# Patient Record
Sex: FEMALE | Race: WHITE | NOT HISPANIC OR LATINO | Employment: FULL TIME | URBAN - METROPOLITAN AREA
[De-identification: names, ages, dates, MRNs, and addresses within clinical notes are randomized per-mention and may not be internally consistent; named-entity substitution may affect disease eponyms.]

---

## 2024-09-22 ENCOUNTER — APPOINTMENT (OUTPATIENT)
Dept: MRI IMAGING | Facility: CLINIC | Age: 54
End: 2024-09-22
Attending: EMERGENCY MEDICINE

## 2024-09-22 ENCOUNTER — HOSPITAL ENCOUNTER (EMERGENCY)
Facility: CLINIC | Age: 54
Discharge: HOME | End: 2024-09-22
Attending: EMERGENCY MEDICINE | Admitting: EMERGENCY MEDICINE

## 2024-09-22 VITALS
HEART RATE: 64 BPM | TEMPERATURE: 97.9 F | HEIGHT: 63 IN | OXYGEN SATURATION: 98 % | SYSTOLIC BLOOD PRESSURE: 134 MMHG | RESPIRATION RATE: 11 BRPM | BODY MASS INDEX: 23.05 KG/M2 | WEIGHT: 130.07 LBS | DIASTOLIC BLOOD PRESSURE: 101 MMHG

## 2024-09-22 DIAGNOSIS — R42 DIZZINESS: ICD-10-CM

## 2024-09-22 DIAGNOSIS — R55 SYNCOPE AND COLLAPSE: ICD-10-CM

## 2024-09-22 DIAGNOSIS — I67.1 ANEURYSM OF CIRCLE OF WILLIS: ICD-10-CM

## 2024-09-22 LAB
ALBUMIN SERPL BCG-MCNC: 3.9 G/DL (ref 3.5–5.2)
ALBUMIN UR-MCNC: NEGATIVE MG/DL
ALP SERPL-CCNC: 74 U/L (ref 40–150)
ALT SERPL W P-5'-P-CCNC: 15 U/L (ref 0–50)
ANION GAP SERPL CALCULATED.3IONS-SCNC: 11 MMOL/L (ref 7–15)
APPEARANCE UR: CLEAR
AST SERPL W P-5'-P-CCNC: 13 U/L (ref 0–45)
ATRIAL RATE - MUSE: 60 BPM
BASOPHILS # BLD AUTO: 0 10E3/UL (ref 0–0.2)
BASOPHILS NFR BLD AUTO: 0 %
BILIRUB SERPL-MCNC: <0.2 MG/DL
BILIRUB UR QL STRIP: NEGATIVE
BUN SERPL-MCNC: 16.8 MG/DL (ref 6–20)
CALCIUM SERPL-MCNC: 9.1 MG/DL (ref 8.8–10.4)
CHLORIDE SERPL-SCNC: 106 MMOL/L (ref 98–107)
COLOR UR AUTO: ABNORMAL
CREAT SERPL-MCNC: 0.8 MG/DL (ref 0.51–0.95)
DIASTOLIC BLOOD PRESSURE - MUSE: NORMAL MMHG
EGFRCR SERPLBLD CKD-EPI 2021: 87 ML/MIN/1.73M2
EOSINOPHIL # BLD AUTO: 0.2 10E3/UL (ref 0–0.7)
EOSINOPHIL NFR BLD AUTO: 2 %
ERYTHROCYTE [DISTWIDTH] IN BLOOD BY AUTOMATED COUNT: 12.6 % (ref 10–15)
FLUAV RNA SPEC QL NAA+PROBE: NEGATIVE
FLUBV RNA RESP QL NAA+PROBE: NEGATIVE
GLUCOSE SERPL-MCNC: 126 MG/DL (ref 70–99)
GLUCOSE UR STRIP-MCNC: NEGATIVE MG/DL
HCO3 SERPL-SCNC: 25 MMOL/L (ref 22–29)
HCT VFR BLD AUTO: 37.2 % (ref 35–47)
HGB BLD-MCNC: 12.6 G/DL (ref 11.7–15.7)
HGB UR QL STRIP: NEGATIVE
HOLD SPECIMEN: NORMAL
HOLD SPECIMEN: NORMAL
IMM GRANULOCYTES # BLD: 0 10E3/UL
IMM GRANULOCYTES NFR BLD: 0 %
INTERPRETATION ECG - MUSE: NORMAL
KETONES UR STRIP-MCNC: NEGATIVE MG/DL
LEUKOCYTE ESTERASE UR QL STRIP: ABNORMAL
LYMPHOCYTES # BLD AUTO: 3.4 10E3/UL (ref 0.8–5.3)
LYMPHOCYTES NFR BLD AUTO: 48 %
MAGNESIUM SERPL-MCNC: 2.2 MG/DL (ref 1.7–2.3)
MCH RBC QN AUTO: 32.4 PG (ref 26.5–33)
MCHC RBC AUTO-ENTMCNC: 33.9 G/DL (ref 31.5–36.5)
MCV RBC AUTO: 96 FL (ref 78–100)
MONOCYTES # BLD AUTO: 0.6 10E3/UL (ref 0–1.3)
MONOCYTES NFR BLD AUTO: 8 %
MUCOUS THREADS #/AREA URNS LPF: PRESENT /LPF
NEUTROPHILS # BLD AUTO: 2.9 10E3/UL (ref 1.6–8.3)
NEUTROPHILS NFR BLD AUTO: 41 %
NITRATE UR QL: NEGATIVE
NRBC # BLD AUTO: 0 10E3/UL
NRBC BLD AUTO-RTO: 0 /100
P AXIS - MUSE: 63 DEGREES
PH UR STRIP: 6 [PH] (ref 5–7)
PLATELET # BLD AUTO: 259 10E3/UL (ref 150–450)
POTASSIUM SERPL-SCNC: 3.7 MMOL/L (ref 3.4–5.3)
PR INTERVAL - MUSE: 170 MS
PROT SERPL-MCNC: 5.6 G/DL (ref 6.4–8.3)
QRS DURATION - MUSE: 96 MS
QT - MUSE: 420 MS
QTC - MUSE: 420 MS
R AXIS - MUSE: 59 DEGREES
RBC # BLD AUTO: 3.89 10E6/UL (ref 3.8–5.2)
RBC URINE: 4 /HPF
RSV RNA SPEC NAA+PROBE: NEGATIVE
SARS-COV-2 RNA RESP QL NAA+PROBE: NEGATIVE
SODIUM SERPL-SCNC: 142 MMOL/L (ref 135–145)
SP GR UR STRIP: 1.01 (ref 1–1.03)
SQUAMOUS EPITHELIAL: 4 /HPF
SYSTOLIC BLOOD PRESSURE - MUSE: NORMAL MMHG
T AXIS - MUSE: 62 DEGREES
TROPONIN T SERPL HS-MCNC: <6 NG/L
TROPONIN T SERPL HS-MCNC: <6 NG/L
TSH SERPL DL<=0.005 MIU/L-ACNC: 2.14 UIU/ML (ref 0.3–4.2)
UROBILINOGEN UR STRIP-MCNC: NORMAL MG/DL
VENTRICULAR RATE- MUSE: 60 BPM
WBC # BLD AUTO: 7 10E3/UL (ref 4–11)
WBC URINE: 8 /HPF

## 2024-09-22 PROCEDURE — 85041 AUTOMATED RBC COUNT: CPT | Performed by: EMERGENCY MEDICINE

## 2024-09-22 PROCEDURE — 83735 ASSAY OF MAGNESIUM: CPT | Performed by: EMERGENCY MEDICINE

## 2024-09-22 PROCEDURE — 99285 EMERGENCY DEPT VISIT HI MDM: CPT | Mod: 25

## 2024-09-22 PROCEDURE — 93005 ELECTROCARDIOGRAM TRACING: CPT

## 2024-09-22 PROCEDURE — 84443 ASSAY THYROID STIM HORMONE: CPT | Performed by: EMERGENCY MEDICINE

## 2024-09-22 PROCEDURE — 81001 URINALYSIS AUTO W/SCOPE: CPT | Performed by: EMERGENCY MEDICINE

## 2024-09-22 PROCEDURE — 96361 HYDRATE IV INFUSION ADD-ON: CPT

## 2024-09-22 PROCEDURE — 80053 COMPREHEN METABOLIC PANEL: CPT | Performed by: EMERGENCY MEDICINE

## 2024-09-22 PROCEDURE — 70544 MR ANGIOGRAPHY HEAD W/O DYE: CPT

## 2024-09-22 PROCEDURE — 84484 ASSAY OF TROPONIN QUANT: CPT | Performed by: EMERGENCY MEDICINE

## 2024-09-22 PROCEDURE — 87637 SARSCOV2&INF A&B&RSV AMP PRB: CPT | Performed by: EMERGENCY MEDICINE

## 2024-09-22 PROCEDURE — A9585 GADOBUTROL INJECTION: HCPCS | Performed by: EMERGENCY MEDICINE

## 2024-09-22 PROCEDURE — 255N000002 HC RX 255 OP 636: Performed by: EMERGENCY MEDICINE

## 2024-09-22 PROCEDURE — 250N000013 HC RX MED GY IP 250 OP 250 PS 637: Performed by: EMERGENCY MEDICINE

## 2024-09-22 PROCEDURE — 36415 COLL VENOUS BLD VENIPUNCTURE: CPT | Performed by: EMERGENCY MEDICINE

## 2024-09-22 PROCEDURE — 70553 MRI BRAIN STEM W/O & W/DYE: CPT

## 2024-09-22 PROCEDURE — 258N000003 HC RX IP 258 OP 636: Performed by: EMERGENCY MEDICINE

## 2024-09-22 PROCEDURE — 96360 HYDRATION IV INFUSION INIT: CPT | Mod: 59

## 2024-09-22 PROCEDURE — 70549 MR ANGIOGRAPH NECK W/O&W/DYE: CPT

## 2024-09-22 RX ORDER — PROPRANOLOL HYDROCHLORIDE 10 MG/1
10 TABLET ORAL ONCE
COMMUNITY

## 2024-09-22 RX ORDER — GADOBUTROL 604.72 MG/ML
10 INJECTION INTRAVENOUS ONCE
Status: COMPLETED | OUTPATIENT
Start: 2024-09-22 | End: 2024-09-22

## 2024-09-22 RX ORDER — PROGESTERONE 200 MG/1
200 CAPSULE ORAL DAILY
COMMUNITY

## 2024-09-22 RX ORDER — MECLIZINE HYDROCHLORIDE 25 MG/1
25 TABLET ORAL ONCE
Status: COMPLETED | OUTPATIENT
Start: 2024-09-22 | End: 2024-09-22

## 2024-09-22 RX ADMIN — GADOBUTROL 10 ML: 604.72 INJECTION INTRAVENOUS at 20:21

## 2024-09-22 RX ADMIN — SODIUM CHLORIDE 1000 ML: 9 INJECTION, SOLUTION INTRAVENOUS at 17:12

## 2024-09-22 RX ADMIN — SODIUM CHLORIDE 1000 ML: 9 INJECTION, SOLUTION INTRAVENOUS at 15:15

## 2024-09-22 RX ADMIN — MECLIZINE HYDROCHLORIDE 25 MG: 25 TABLET ORAL at 18:25

## 2024-09-22 ASSESSMENT — ACTIVITIES OF DAILY LIVING (ADL)
ADLS_ACUITY_SCORE: 35

## 2024-09-22 ASSESSMENT — COLUMBIA-SUICIDE SEVERITY RATING SCALE - C-SSRS
2. HAVE YOU ACTUALLY HAD ANY THOUGHTS OF KILLING YOURSELF IN THE PAST MONTH?: NO
6. HAVE YOU EVER DONE ANYTHING, STARTED TO DO ANYTHING, OR PREPARED TO DO ANYTHING TO END YOUR LIFE?: NO
1. IN THE PAST MONTH, HAVE YOU WISHED YOU WERE DEAD OR WISHED YOU COULD GO TO SLEEP AND NOT WAKE UP?: NO

## 2024-09-22 NOTE — ED PROVIDER NOTES
"    History     Chief Complaint:  Syncope and Shoulder Pain       HPI   Sharon Power is a 54 year old female who is a  from [place].  She was on a flight from [place] to [place].  For the end of the flight she noted to coworkers that she was lightheaded.  She was assisted to a sitting position and reportedly had a syncopal episode.  She states she recalls being \"in and out of consciousness\".  She did not have any chest pain prior to the episode.  No palpitations.  She is treated for Graves' disease.  No recent changes to her medications.  No history of DVT or PE.  She denies any recent URI symptoms.  No fever, vomiting, or diarrhea.  She says that she has been somewhat sleep deprived from a short turnaround time between flights recently.  Otherwise no recent changes to her lifestyle or diet.  She says she tries very hard to stay hydrated and drinks lots of water.  There was no witnessed seizure activity on the plane.  The patient was reportedly somewhat hypotensive on the plane and with EMS.  She was nauseous en route but did not vomit.  She received Zofran and this has resolved.      Independent Historian:    History taken from EMS who report the events of her syncope    Medications:    progesterone (PROMETRIUM) 200 MG capsule  propranolol (INDERAL) 10 MG tablet        Past Medical History:    Graves' disease    Past Surgical History:    No past surgical history on file.       Physical Exam   Patient Vitals for the past 24 hrs:   BP Temp Temp src Pulse Resp SpO2 Height Weight   09/22/24 1928 -- -- -- 64 11 95 % -- --   09/22/24 1858 134/88 -- -- 69 16 -- -- --   09/22/24 1715 132/88 -- -- 59 -- 96 % -- --   09/22/24 1550 (!) 153/94 -- -- 74 22 -- -- --   09/22/24 1545 (!) 145/97 -- -- 57 17 -- -- --   09/22/24 1353 120/87 97.9  F (36.6  C) Oral 58 20 98 % 1.6 m (5' 3\") 59 kg (130 lb 1.1 oz)        Physical Exam  Constitutional:       General: She is not in acute distress.     Appearance: " Normal appearance. She is not diaphoretic.   HENT:      Head: Atraumatic.      Right Ear: Tympanic membrane, ear canal and external ear normal.      Left Ear: Tympanic membrane, ear canal and external ear normal.      Mouth/Throat:      Mouth: Mucous membranes are moist.   Eyes:      General: No scleral icterus.     Conjunctiva/sclera: Conjunctivae normal.      Comments: No nystagmus   Cardiovascular:      Rate and Rhythm: Normal rate and regular rhythm.      Heart sounds: Normal heart sounds.   Pulmonary:      Effort: No respiratory distress.      Breath sounds: Normal breath sounds.   Abdominal:      General: Abdomen is flat. There is no distension.      Tenderness: There is no abdominal tenderness.   Musculoskeletal:      Cervical back: Neck supple.   Skin:     General: Skin is warm.      Findings: No rash.   Neurological:      Mental Status: She is alert.      Comments: Gait is unstable.  Speech is fluent.  No facial asymmetry.  Strength 5 out of 5 neck bilaterally for  strength in the hand as well as straight leg raise bilaterally.  Sensation light touch intact and symmetric over the face, arms, legs.   Psychiatric:         Mood and Affect: Mood normal.         Behavior: Behavior normal.           Emergency Department Course   ECG  ECG results from 09/22/24   EKG 12-lead, tracing only     Value    Systolic Blood Pressure     Diastolic Blood Pressure     Ventricular Rate 60    Atrial Rate 60    NY Interval 170    QRS Duration 96        QTc 420    P Axis 63    R AXIS 59    T Axis 62    Interpretation ECG      Sinus rhythm  Cannot rule out Anterior infarct , age undetermined  Abnormal ECG  No previous ECGs available  Confirmed by GENERATED REPORT, COMPUTER ([id number]),  Dhaval Armstrong ([id number]) on 9/22/2024 6:20:48 PM          Imaging:  MR Brain w/o & w Contrast   Final Result   IMPRESSION:   HEAD MRI:    1.  No acute infarct, mass, mass effect, or hemorrhage.      HEAD MRA:    1.  2 mm inferiorly  projecting outpouching at the distal aspect of right A2 segment. This could be infundibulum of a poorly visualized vessel however a small aneurysm may have similar appearance.   2.  Remainder of intracranial circulation appears normal.      NECK MRA:   1.  Normal neck MRA.      MRA Angiogram Head w/o Contrast   Final Result   IMPRESSION:   HEAD MRI:    1.  No acute infarct, mass, mass effect, or hemorrhage.      HEAD MRA:    1.  2 mm inferiorly projecting outpouching at the distal aspect of right A2 segment. This could be infundibulum of a poorly visualized vessel however a small aneurysm may have similar appearance.   2.  Remainder of intracranial circulation appears normal.      NECK MRA:   1.  Normal neck MRA.      MRA Angiogram Neck w/o & w Contrast   Final Result   IMPRESSION:   HEAD MRI:    1.  No acute infarct, mass, mass effect, or hemorrhage.      HEAD MRA:    1.  2 mm inferiorly projecting outpouching at the distal aspect of right A2 segment. This could be infundibulum of a poorly visualized vessel however a small aneurysm may have similar appearance.   2.  Remainder of intracranial circulation appears normal.      NECK MRA:   1.  Normal neck MRA.          Laboratory:  Labs Ordered and Resulted from Time of ED Arrival to Time of ED Departure   COMPREHENSIVE METABOLIC PANEL - Abnormal       Result Value    Sodium 142      Potassium 3.7      Carbon Dioxide (CO2) 25      Anion Gap 11      Urea Nitrogen 16.8      Creatinine 0.80      GFR Estimate 87      Calcium 9.1      Chloride 106      Glucose 126 (*)     Alkaline Phosphatase 74      AST 13      ALT 15      Protein Total 5.6 (*)     Albumin 3.9      Bilirubin Total <0.2     ROUTINE UA WITH MICROSCOPIC REFLEX TO CULTURE - Abnormal    Color Urine Light Yellow      Appearance Urine Clear      Glucose Urine Negative      Bilirubin Urine Negative      Ketones Urine Negative      Specific Gravity Urine 1.011      Blood Urine Negative      pH Urine 6.0       Protein Albumin Urine Negative      Urobilinogen Urine Normal      Nitrite Urine Negative      Leukocyte Esterase Urine Trace (*)     Mucus Urine Present (*)     RBC Urine 4 (*)     WBC Urine 8 (*)     Squamous Epithelials Urine 4 (*)    TROPONIN T, HIGH SENSITIVITY - Normal    Troponin T, High Sensitivity <6     TSH WITH FREE T4 REFLEX - Normal    TSH 2.14     MAGNESIUM - Normal    Magnesium 2.2     TROPONIN T, HIGH SENSITIVITY - Normal    Troponin T, High Sensitivity <6     INFLUENZA A/B, RSV, & SARS-COV2 PCR - Normal    Influenza A PCR Negative      Influenza B PCR Negative      RSV PCR Negative      SARS CoV2 PCR Negative     CBC WITH PLATELETS AND DIFFERENTIAL    WBC Count 7.0      RBC Count 3.89      Hemoglobin 12.6      Hematocrit 37.2      MCV 96      MCH 32.4      MCHC 33.9      RDW 12.6      Platelet Count 259      % Neutrophils 41      % Lymphocytes 48      % Monocytes 8      % Eosinophils 2      % Basophils 0      % Immature Granulocytes 0      NRBCs per 100 WBC 0      Absolute Neutrophils 2.9      Absolute Lymphocytes 3.4      Absolute Monocytes 0.6      Absolute Eosinophils 0.2      Absolute Basophils 0.0      Absolute Immature Granulocytes 0.0      Absolute NRBCs 0.0          Emergency Department Course & Assessments:    Interventions:  Medications   sodium chloride 0.9% BOLUS 1,000 mL (0 mLs Intravenous Stopped 9/22/24 1712)   sodium chloride 0.9% BOLUS 1,000 mL (0 mLs Intravenous Stopped 9/22/24 1919)   meclizine (ANTIVERT) tablet 25 mg (25 mg Oral $Given 9/22/24 1825)   gadobutrol (GADAVIST) injection 10 mL (10 mLs Intravenous $Given 9/22/24 2021)      Disposition:  The patient was discharged.    Impression & Plan       Medical Decision Making:  This patient is a very pleasant 54-year-old who presents to the ED following a syncopal episode on the airplane.  She says that she had room spinning dizziness beforehand.  There was no seizure activity noted.  She was hypotensive and initially this  appeared to be orthostasis.  However, after aggressive hydration she was improved but still felt symptomatic and felt that her gait was ataxic when she got up to walk to the bathroom.    She was given meclizine for possible peripheral vertigo.  Given the persistence of her symptoms I considered possible cerebellar infarct.  We discussed an MRI scan which fortunately is negative for infarct, bleed, or mass lesion.  We discussed the possible [place] aneurysm which she will follow with her physician in [place].    The patient is overall feeling improved after nearly 8 hours of observation here in the ED.  She is appropriate for discharge at this point.  She will return for any further problems.      Diagnosis:    ICD-10-CM    1. Syncope and collapse  R55       2. Dizziness  R42       3. 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

## 2024-09-22 NOTE — ED NOTES
Bed: ED25  Expected date:   Expected time:   Means of arrival:   Comments:  [address] 54F syncope

## 2024-09-22 NOTE — ED TRIAGE NOTES
"  BIBA from [facility]: Per EMS pt is a  From [place] travelling  internationally from [facility] to [facility]. According to pt co-workers,  pt was sitting down when she  told  her co-workers  she did did feel good, became dizzy, blurred vision and had a syncope episode. Unclear had long the syncope episode lasted. BG  and vitals were  checked in- flight, BG  was 70 and 80,  to 140 systolic. Pt was given Sugar  in flight.     EMS arrived, Pt a/o x 4. Pt c/o nausea, VSS,  , /80, 12 Lead \" Nice and good\". IV 18 G placed LAC. Zofran 4 mg and NS 120cc given. Hx: Thyroid Storm    Triage Assessment (Adult)       Row Name 09/22/24 4663          Triage Assessment    Airway WDL WDL        Respiratory WDL    Respiratory WDL WDL        Skin Circulation/Temperature WDL    Skin Circulation/Temperature WDL WDL        Cardiac WDL    Cardiac WDL WDL;X     Cardiac Rhythm NSR        Peripheral/Neurovascular WDL    Peripheral Neurovascular WDL WDL        Cognitive/Neuro/Behavioral WDL    Cognitive/Neuro/Behavioral WDL WDL                     "

## 2024-09-23 NOTE — DISCHARGE INSTRUCTIONS
Please drink plenty fluids and rest is much as you can.    If you are feeling asymptomatic is okay to fly and work tomorrow.    Your MRI scan shows a possible aneurysm of the right A2 segment of the [place].  You should have this followed through your physician when you return home.    Return to the ER for any further problems.